# Patient Record
Sex: MALE | Race: NATIVE HAWAIIAN OR OTHER PACIFIC ISLANDER | HISPANIC OR LATINO | URBAN - METROPOLITAN AREA
[De-identification: names, ages, dates, MRNs, and addresses within clinical notes are randomized per-mention and may not be internally consistent; named-entity substitution may affect disease eponyms.]

---

## 2024-11-15 ENCOUNTER — EMERGENCY (EMERGENCY)
Facility: HOSPITAL | Age: 24
LOS: 1 days | Discharge: HOME | End: 2024-11-15
Attending: EMERGENCY MEDICINE
Payer: MEDICAID

## 2024-11-15 VITALS — WEIGHT: 130.07 LBS | HEIGHT: 62.99 IN

## 2024-11-15 VITALS
RESPIRATION RATE: 16 BRPM | OXYGEN SATURATION: 98 % | SYSTOLIC BLOOD PRESSURE: 125 MMHG | HEART RATE: 75 BPM | DIASTOLIC BLOOD PRESSURE: 78 MMHG | TEMPERATURE: 98 F

## 2024-11-15 PROCEDURE — 90471 IMMUNIZATION ADMIN: CPT

## 2024-11-15 PROCEDURE — 99283 EMERGENCY DEPT VISIT LOW MDM: CPT | Mod: 25

## 2024-11-15 PROCEDURE — 73564 X-RAY EXAM KNEE 4 OR MORE: CPT

## 2024-11-15 PROCEDURE — 90715 TDAP VACCINE 7 YRS/> IM: CPT

## 2024-11-15 PROCEDURE — 73564 X-RAY EXAM KNEE 4 OR MORE: CPT | Mod: 26,RT

## 2024-11-15 PROCEDURE — 99284 EMERGENCY DEPT VISIT MOD MDM: CPT

## 2024-11-15 RX ORDER — IBUPROFEN 200 MG
600 TABLET ORAL ONCE
Refills: 0 | Status: COMPLETED | OUTPATIENT
Start: 2024-11-15 | End: 2024-11-15

## 2024-11-15 RX ORDER — CLOSTRIDIUM TETANI TOXOID ANTIGEN (FORMALDEHYDE INACTIVATED), CORYNEBACTERIUM DIPHTHERIAE TOXOID ANTIGEN (FORMALDEHYDE INACTIVATED), BORDETELLA PERTUSSIS TOXOID ANTIGEN (GLUTARALDEHYDE INACTIVATED), BORDETELLA PERTUSSIS FILAMENTOUS HEMAGGLUTININ ANTIGEN (FORMALDEHYDE INACTIVATED), BORDETELLA PERTUSSIS PERTACTIN ANTIGEN, AND BORDETELLA PERTUSSIS FIMBRIAE 2/3 ANTIGEN 5; 2; 2.5; 5; 3; 5 [LF]/.5ML; [LF]/.5ML; UG/.5ML; UG/.5ML; UG/.5ML; UG/.5ML
0.5 INJECTION, SUSPENSION INTRAMUSCULAR ONCE
Refills: 0 | Status: COMPLETED | OUTPATIENT
Start: 2024-11-15 | End: 2024-11-15

## 2024-11-15 RX ORDER — LIDOCAINE HYDROCHLORIDE 40 MG/ML
1 SOLUTION TOPICAL ONCE
Refills: 0 | Status: COMPLETED | OUTPATIENT
Start: 2024-11-15 | End: 2024-11-15

## 2024-11-15 RX ORDER — ACETAMINOPHEN 500 MG
975 TABLET ORAL ONCE
Refills: 0 | Status: COMPLETED | OUTPATIENT
Start: 2024-11-15 | End: 2024-11-15

## 2024-11-15 RX ADMIN — Medication 600 MILLIGRAM(S): at 19:47

## 2024-11-15 RX ADMIN — LIDOCAINE HYDROCHLORIDE 1 PATCH: 40 SOLUTION TOPICAL at 19:48

## 2024-11-15 RX ADMIN — Medication 975 MILLIGRAM(S): at 19:48

## 2024-11-15 RX ADMIN — CLOSTRIDIUM TETANI TOXOID ANTIGEN (FORMALDEHYDE INACTIVATED), CORYNEBACTERIUM DIPHTHERIAE TOXOID ANTIGEN (FORMALDEHYDE INACTIVATED), BORDETELLA PERTUSSIS TOXOID ANTIGEN (GLUTARALDEHYDE INACTIVATED), BORDETELLA PERTUSSIS FILAMENTOUS HEMAGGLUTININ ANTIGEN (FORMALDEHYDE INACTIVATED), BORDETELLA PERTUSSIS PERTACTIN ANTIGEN, AND BORDETELLA PERTUSSIS FIMBRIAE 2/3 ANTIGEN 0.5 MILLILITER(S): 5; 2; 2.5; 5; 3; 5 INJECTION, SUSPENSION INTRAMUSCULAR at 21:57

## 2024-11-15 NOTE — ED ADULT NURSE NOTE - OBJECTIVE STATEMENT
23y/o male presents to ER from home with c/o right knee pain. 20 days ago pt had a work injury where he got a cut above the right knee. Pt noted ecchymosis to right knee about 3 days ago. Knee is now tender to touch. Endorses night sweats. Denies SOB, CP, numbness. A&Ox3, Pt able to ambulate. Safety maintained bed in lowest position and locked.

## 2024-11-15 NOTE — ED ADULT TRIAGE NOTE - CHIEF COMPLAINT QUOTE
Right knee pain  s/p  laceration 3 weeks  ago. Patient  did use  Neosporin but  pain persists at the  site.

## 2024-11-15 NOTE — ED PROVIDER NOTE - OBJECTIVE STATEMENT
24 year old man no PMH presenting with knee pain after a laceration 20 days ago. He was at work and got cut on the right superior knee with a knife, but his boss didn't let him get treatment. He only put gauze on it, and the wound healed well over the past 20 days. A few days ago he began having brusining and pain of the inferior medial knee, worsening pain that comes and goes, pain with bending the knee, and also reporting getting feveres at night, but hasn't taken his temperature. He denies any chills, rashes, swelling, bleeding, pus, chest pain, SOB, abd pain, N/V/D/C, dysuria.     YONAS [id number]

## 2024-11-15 NOTE — CHART NOTE - NSCHARTNOTEFT_GEN_A_CORE
ED SW-    LMSW consulted by ED MD for patient in ED requesting to speak with SW. Per chart, patient is a 25 y/o M presenting with knee laceration that occurred 20 days ago at work and now experiencing worsening pain. Per chart, patient stated his boss would not let him receive treatment. Per ED MD, patient medically cleared for d/c.    LMSW met with patient at bedside to introduce self and role. [employer]  Carrie #[id number] and Shay #[id number] utilized for Citizen of [place] translation. Patient appeared a&ox4 and verbalized understanding. Patient confirmed demographics on chart. Patient expressed he is undocumented without insurance. LMSW educated patient to emergency medicaid insurance and advised patient to follow up with [facility] Financial Counseling Department (number provided to patient) to determine eligibility and application process. Patient verbalized understanding and appreciative.     Patient stated he hurt his knee at work 20 days ago, and his boss refused him to receive treatment. Patient inquired if he can obtain documentation of ED visit to provide to boss. LMSW informed patient that his discharge paperwork will provide information about ED visit to show his boss. Patient also expressed he is unable to obtain contact with his boss and inquired what his next steps should be. LMSW provided patient with Legal Services resource and advised patient to contact to determine next steps with potential legal action. Patient verbalized understanding and appreciative. LMSW inquired about patient's transportation home. Patient confirmed he does not need transportation home and denied further questions/concerns. ED MD made aware of above. No further SW needs. SW to remain available.

## 2024-11-15 NOTE — ED PROVIDER NOTE - PHYSICAL EXAMINATION
Physical Exam:  Gen: no acute distress, AOx3, nontoxic appearing, able to ambulate without assistance  Head: NCAT  HEENT: EOMI, PEERLA, normal conjunctiva, tongue midline, oral mucosa moist  Lung: CTAB, no respiratory distress, no wheezes/rhonchi/rales B/L, speaking in full sentences  CV: RRR, no murmurs, rubs or gallops  Abd: soft, NT, ND, no guarding, no rigidity, no rebound tenderness, no CVA tenderness  MSK: no visible deformities, ROM normal in UE/LE, no neck / back pain, calf tenderness, R knee with well healing laceration, brusining on medial knee with tenderness, can range to 90 degrees and full extension, no effusion.   Neuro: No focal sensory or motor deficits  Skin: Warm, well perfused, no rash, no leg swelling

## 2024-11-15 NOTE — ED PROVIDER NOTE - NSFOLLOWUPCLINICS_GEN_ALL_ED_FT
[facility] Orthopedic Surgery  Orthopedic Surgery  [address], [address]rd & 4th [place] [address]  Phone: [phone]  Fax:   Follow Up Time: 1-3 Days

## 2024-11-15 NOTE — ED PROVIDER NOTE - PROGRESS NOTE DETAILS
Juan Diego ALTMAN PGY1: Patient feeling well, ambulatory, was seen by social work. Discussed follow up, medications, return precautions. Juan Diego ALTMAN PGY1: Patient feeling well, ambulatory, was seen by social work. Discussed follow up, medications, return precautions, reuslts of imaging.

## 2024-11-15 NOTE — ED ADULT TRIAGE NOTE - LANGUAGE ASSISTANCE NEEDED
Addended by: EDILSON LANDIS on: 9/27/2023 10:57 AM     Modules accepted: Orders    
Yes-Patient/Caregiver accepts free interpretation services...

## 2024-11-15 NOTE — ED ADULT TRIAGE NOTE - PAIN: PRESENCE, MLM
No behaviors noted on night shift and no PRN's were given. No signs or symptoms of discomfort or distress noted. Pt is lying in bed with eye's closed. Will continue fifteen minute rounds. complains of pain/discomfort

## 2024-11-15 NOTE — ED PROVIDER NOTE - CLINICAL SUMMARY MEDICAL DECISION MAKING FREE TEXT BOX
24 year old man no PMH presenting with knee pain after a laceration 20 days ago, ambulatory but with pain and tenderness over medial knee concerning for fracture, less likely cellulitis vs septic arthritis given full range of motion and lack of infectious signs. Xray and reeval.

## 2024-11-15 NOTE — ED ADULT NURSE NOTE - CCCP TRG CHIEF CMPLNT
Per monthly Recall Report,  Letter was mailed to patient to inform them that they are due for an appointment with Dr Ayoub.    Reason for visit: 1 year fu  Date last seen: 2/13/19       Warm knee pain/injury

## 2024-11-15 NOTE — ED PROVIDER NOTE - NSFOLLOWUPINSTRUCTIONS_ED_ALL_ED_FT
Le atendieron en urgencias por dolor de rodilla. Jud radiografías no mostraron ningún hueso roto y podía caminar sobre jud rodillas. Para el dolor, puede jose enrique 1000 mg de tylenol cada 6 horas, no más de 4000 mg en un período de 24 horas y ibuprofeno 600 mg cada 6 horas según sea necesario para el dolor. Es muy importante realizar un seguimiento con un médico ortopédico o un médico deportivo.     Regrese al Departamento de Emergencia si experimenta dolor intenso que no se controla con medicamentos, incapacidad para caminar, enrojecimiento e hinchazón de la rodilla, pus saliendo de la herida en la rodilla.    You were seen in the Emergency Department for knee pain. Your xrays did not show any broken bones, and you were able to walk on your knee. For pain you can take 1000mg of tylenol every 6 hours, no more than 4000mg in one 24 period, and ibuprofen 600mg every 6 hours as needed for pain. It is very important to follow up with an orthopedic doctor or a sports medicine doctor.     Return to the Emergency Department if you experience severe pain not controlled with medications, inability to walk, redness and swelling of the knee, pus coming from the wound on the knee. Le atendieron en urgencias por dolor de rodilla. Jud radiografías no mostraron ningún hueso roto y podía caminar sobre jud rodillas. Para el dolor, puede jose enrique 1000 mg de tylenol cada 6 horas, no más de 4000 mg en un período de 24 horas y ibuprofeno 600 mg cada 6 horas según sea necesario para el dolor. Es muy importante realizar un seguimiento con un médico ortopédico o un médico deportivo.     Regrese al Departamento de Emergencia si experimenta dolor intenso que no se controla con medicamentos, incapacidad para caminar, enrojecimiento e hinchazón de la rodilla, pus saliendo de la herida en la rodilla.    You were seen in the Emergency Department for knee pain. Your xrays did not show any broken bones, and you were able to walk on your knee. For pain you can take 1000mg of tylenol every 6 hours, no more than 4000mg in one 24 period, and ibuprofen 600mg every 6 hours as needed for pain. It is very important to follow up with an orthopedic doctor or a sports medicine doctor.     Return to the Emergency Department if you experience severe pain not controlled with medications, inability to walk, redness and swelling of the knee, pus coming from the wound on the knee, swelling, severe pain, redness, or bleeding at the injection site of your TDAP booster, fever, wound breaks open, bad smell coming from wound or dressing, severe pain, increased redness, swelling, or pain at the site of your wound, fluid, blood, or pus coming from your wound, rash, red streak going away from wound, you cannot properly move area of wound, if extremity with wound is pale or blue or any other concerns.

## 2024-11-15 NOTE — ED ADULT NURSE REASSESSMENT NOTE - NS ED NURSE REASSESS COMMENT FT1
Received patient from MICHAEL George, patient at baseline mental status, able to make needs known, patient agreeable to plan of care, pending radiology, comfort and safety provided.

## 2024-11-15 NOTE — ED PROVIDER NOTE - ATTENDING CONTRIBUTION TO CARE
Attending MD Stone: I personally have seen and examined this patient.  Resident note reviewed and agree on plan of care and except where noted.  See below for details.     Seen in [facility]L    24M with no reported contributory PMH/PSH/Meds, no known drug allergies presents to the ED with R knee pain.  Reports at end of October he was cutting carpet and stabbed himself with the tool with which he was cutting.  Reports he did not have treatment at that time.  Reports has been covering it with gauze and reports wound closed.  Reports a few days ago noted bruising appeared distal to the wound site.  Reports has been having pain with bending knee.  Reports has felt warm occasionally but denies howard fever and denies taking temp.  Denies chills, sweats, rash.  Denies purulence from wound, bleeding from wound.  Denies chest pain, shortness of breath, abdominal pain, nausea, vomiting, diarrhea, urinary complaints, URI symptoms. Unknown last TDAP.    Exam:   General: NAD  HENT: head NCAT, airway patent   Chest: symmetric chest rise, no increased work of breathing  MSK: ranging neck freely, R knee with healing 1.5cm wound at supramedial aspect of knee, no surrounding erythema, no overlying warmth, no fluctuance, +ecchymosis distal to wound at distal medial knee, able to flex at least to 90 and extend unassisted, no gross effusion, +2 DPs, no calf tenderness, swelling, erythema or warmth, +mild tenderness to periwound site  Neuro: moving all extremities spontaneously, sensory grossly intact, no gross neuro deficits  Psych: normal mood and affect     A/P: 24M with R knee ecchymosis and minimal elisa-injury tenderness, will obtain XR to eval for retained foreign body, or chip fracture or air, will update TDAP and will give analgesia

## 2024-11-15 NOTE — ED PROVIDER NOTE - PATIENT PORTAL LINK FT
You can access the FollowMyHealth Patient Portal offered by [facility] by registering at the following website: [email or web address]. By joining [employer]’s FollowMyHealth portal, you will also be able to view your health information using other applications (apps) compatible with our system.

## 2025-05-10 ENCOUNTER — EMERGENCY (EMERGENCY)
Facility: HOSPITAL | Age: 25
LOS: 1 days | End: 2025-05-10
Attending: EMERGENCY MEDICINE
Payer: MEDICAID

## 2025-05-10 VITALS
SYSTOLIC BLOOD PRESSURE: 118 MMHG | HEIGHT: 62.6 IN | RESPIRATION RATE: 20 BRPM | TEMPERATURE: 98 F | WEIGHT: 130.07 LBS | DIASTOLIC BLOOD PRESSURE: 73 MMHG | OXYGEN SATURATION: 98 % | HEART RATE: 107 BPM

## 2025-05-10 PROBLEM — Z78.9 OTHER SPECIFIED HEALTH STATUS: Chronic | Status: ACTIVE | Noted: 2024-11-15

## 2025-05-10 LAB
ALBUMIN SERPL ELPH-MCNC: 4.6 G/DL — SIGNIFICANT CHANGE UP (ref 3.3–5)
ALP SERPL-CCNC: 75 U/L — SIGNIFICANT CHANGE UP (ref 40–120)
ALT FLD-CCNC: 31 U/L — SIGNIFICANT CHANGE UP (ref 10–45)
ANION GAP SERPL CALC-SCNC: 12 MMOL/L — SIGNIFICANT CHANGE UP (ref 5–17)
APPEARANCE UR: CLEAR — SIGNIFICANT CHANGE UP
AST SERPL-CCNC: 27 U/L — SIGNIFICANT CHANGE UP (ref 10–40)
BASOPHILS # BLD AUTO: 0.05 K/UL — SIGNIFICANT CHANGE UP (ref 0–0.2)
BASOPHILS NFR BLD AUTO: 0.6 % — SIGNIFICANT CHANGE UP (ref 0–2)
BILIRUB SERPL-MCNC: 0.6 MG/DL — SIGNIFICANT CHANGE UP (ref 0.2–1.2)
BILIRUB UR-MCNC: NEGATIVE — SIGNIFICANT CHANGE UP
BUN SERPL-MCNC: 8 MG/DL — SIGNIFICANT CHANGE UP (ref 7–23)
CALCIUM SERPL-MCNC: 9.6 MG/DL — SIGNIFICANT CHANGE UP (ref 8.4–10.5)
CHLORIDE SERPL-SCNC: 100 MMOL/L — SIGNIFICANT CHANGE UP (ref 96–108)
CO2 SERPL-SCNC: 25 MMOL/L — SIGNIFICANT CHANGE UP (ref 22–31)
COLOR SPEC: YELLOW — SIGNIFICANT CHANGE UP
CREAT SERPL-MCNC: 0.78 MG/DL — SIGNIFICANT CHANGE UP (ref 0.5–1.3)
DIFF PNL FLD: NEGATIVE — SIGNIFICANT CHANGE UP
EGFR: 125 ML/MIN/1.73M2 — SIGNIFICANT CHANGE UP
EGFR: 125 ML/MIN/1.73M2 — SIGNIFICANT CHANGE UP
EOSINOPHIL # BLD AUTO: 0.23 K/UL — SIGNIFICANT CHANGE UP (ref 0–0.5)
EOSINOPHIL NFR BLD AUTO: 2.9 % — SIGNIFICANT CHANGE UP (ref 0–6)
GLUCOSE SERPL-MCNC: 101 MG/DL — HIGH (ref 70–99)
GLUCOSE UR QL: NEGATIVE MG/DL — SIGNIFICANT CHANGE UP
HCT VFR BLD CALC: 47.4 % — SIGNIFICANT CHANGE UP (ref 39–50)
HGB BLD-MCNC: 15.9 G/DL — SIGNIFICANT CHANGE UP (ref 13–17)
IMM GRANULOCYTES NFR BLD AUTO: 0.4 % — SIGNIFICANT CHANGE UP (ref 0–0.9)
KETONES UR-MCNC: 40 MG/DL
LACTATE BLDV-MCNC: 1 MMOL/L — SIGNIFICANT CHANGE UP (ref 0.5–2)
LEUKOCYTE ESTERASE UR-ACNC: NEGATIVE — SIGNIFICANT CHANGE UP
LIDOCAIN IGE QN: 41 U/L — SIGNIFICANT CHANGE UP (ref 7–60)
LYMPHOCYTES # BLD AUTO: 1.34 K/UL — SIGNIFICANT CHANGE UP (ref 1–3.3)
LYMPHOCYTES # BLD AUTO: 16.6 % — SIGNIFICANT CHANGE UP (ref 13–44)
MCHC RBC-ENTMCNC: 29.2 PG — SIGNIFICANT CHANGE UP (ref 27–34)
MCHC RBC-ENTMCNC: 33.5 G/DL — SIGNIFICANT CHANGE UP (ref 32–36)
MCV RBC AUTO: 87.1 FL — SIGNIFICANT CHANGE UP (ref 80–100)
MONOCYTES # BLD AUTO: 1.13 K/UL — HIGH (ref 0–0.9)
MONOCYTES NFR BLD AUTO: 14 % — SIGNIFICANT CHANGE UP (ref 2–14)
NEUTROPHILS # BLD AUTO: 5.27 K/UL — SIGNIFICANT CHANGE UP (ref 1.8–7.4)
NEUTROPHILS NFR BLD AUTO: 65.5 % — SIGNIFICANT CHANGE UP (ref 43–77)
NITRITE UR-MCNC: NEGATIVE — SIGNIFICANT CHANGE UP
NRBC BLD AUTO-RTO: 0 /100 WBCS — SIGNIFICANT CHANGE UP (ref 0–0)
PH UR: 6 — SIGNIFICANT CHANGE UP (ref 5–8)
PLATELET # BLD AUTO: 239 K/UL — SIGNIFICANT CHANGE UP (ref 150–400)
POTASSIUM SERPL-MCNC: 4.4 MMOL/L — SIGNIFICANT CHANGE UP (ref 3.5–5.3)
POTASSIUM SERPL-SCNC: 4.4 MMOL/L — SIGNIFICANT CHANGE UP (ref 3.5–5.3)
PROT SERPL-MCNC: 7.9 G/DL — SIGNIFICANT CHANGE UP (ref 6–8.3)
PROT UR-MCNC: SIGNIFICANT CHANGE UP MG/DL
RBC # BLD: 5.44 M/UL — SIGNIFICANT CHANGE UP (ref 4.2–5.8)
RBC # FLD: 12.5 % — SIGNIFICANT CHANGE UP (ref 10.3–14.5)
SODIUM SERPL-SCNC: 137 MMOL/L — SIGNIFICANT CHANGE UP (ref 135–145)
SP GR SPEC: >=1.099 (ref 1–1.03)
UROBILINOGEN FLD QL: 1 MG/DL — SIGNIFICANT CHANGE UP (ref 0.2–1)
WBC # BLD: 8.05 K/UL — SIGNIFICANT CHANGE UP (ref 3.8–10.5)
WBC # FLD AUTO: 8.05 K/UL — SIGNIFICANT CHANGE UP (ref 3.8–10.5)

## 2025-05-10 PROCEDURE — 74177 CT ABD & PELVIS W/CONTRAST: CPT | Mod: 26

## 2025-05-10 PROCEDURE — 99285 EMERGENCY DEPT VISIT HI MDM: CPT

## 2025-05-10 PROCEDURE — 71275 CT ANGIOGRAPHY CHEST: CPT | Mod: 26

## 2025-05-10 PROCEDURE — 71045 X-RAY EXAM CHEST 1 VIEW: CPT | Mod: 26

## 2025-05-10 RX ORDER — AZITHROMYCIN 250 MG
500 CAPSULE ORAL ONCE
Refills: 0 | Status: COMPLETED | OUTPATIENT
Start: 2025-05-10 | End: 2025-05-10

## 2025-05-10 RX ORDER — AZITHROMYCIN 250 MG
1 CAPSULE ORAL
Qty: 3 | Refills: 0
Start: 2025-05-10 | End: 2025-05-12

## 2025-05-10 RX ORDER — CEFTRIAXONE 500 MG/1
1000 INJECTION, POWDER, FOR SOLUTION INTRAMUSCULAR; INTRAVENOUS ONCE
Refills: 0 | Status: COMPLETED | OUTPATIENT
Start: 2025-05-10 | End: 2025-05-10

## 2025-05-10 RX ORDER — KETOROLAC TROMETHAMINE 30 MG/ML
30 INJECTION, SOLUTION INTRAMUSCULAR; INTRAVENOUS ONCE
Refills: 0 | Status: DISCONTINUED | OUTPATIENT
Start: 2025-05-10 | End: 2025-05-10

## 2025-05-10 RX ORDER — AMOXICILLIN AND CLAVULANATE POTASSIUM 500; 125 MG/1; MG/1
1 TABLET, FILM COATED ORAL
Qty: 10 | Refills: 0
Start: 2025-05-10 | End: 2025-05-14

## 2025-05-10 RX ADMIN — Medication 20 MILLIGRAM(S): at 16:14

## 2025-05-10 RX ADMIN — Medication 1000 MILLILITER(S): at 16:14

## 2025-05-10 RX ADMIN — KETOROLAC TROMETHAMINE 30 MILLIGRAM(S): 30 INJECTION, SOLUTION INTRAMUSCULAR; INTRAVENOUS at 16:14

## 2025-05-10 RX ADMIN — CEFTRIAXONE 100 MILLIGRAM(S): 500 INJECTION, POWDER, FOR SOLUTION INTRAMUSCULAR; INTRAVENOUS at 22:01

## 2025-05-10 RX ADMIN — Medication 250 MILLIGRAM(S): at 22:25

## 2025-05-10 NOTE — ED PROVIDER NOTE - OBJECTIVE STATEMENT
25-year-old male no significant past medical history came into the emergency room complaining of left lower quadrant pain and back pain for the last 2 weeks, no fever no chills denies urinary frequency urgency, states he is not sexually active denies any penis discharge or testicular pain, denies nausea vomiting diarrhea

## 2025-05-10 NOTE — ED ADULT NURSE NOTE - OBJECTIVE STATEMENT
25YM no significant PMHX presents to the ED c/o LLQ radiating to back for 2 weeks. 25YM no significant PMHX presents to the ED c/o LLQ radiating to back for 2 weeks. upon assessment, pt is A&OX4 oriented to self, place, time, situation. satting 98% on rm air. Afebrile orally. respirations even and unlabored. abdomen soft, nondistended. skin intact. MD Greer at bedside to assess. 20G IV inserted Right AC. Patient undressed and placed into gown, call bell in hand and side rails up for safety. warm blanket provided, vital signs stable, pt in no acute distress. updated on plan of care. comfort and safety maintained

## 2025-05-10 NOTE — ED PROVIDER NOTE - PATIENT PORTAL LINK FT
You can access the FollowMyHealth Patient Portal offered by [facility] by registering at the following website: [email or web address]. By joining [employer]’s FollowMyHealth portal, you will also be able to view your health information using other applications (apps) compatible with our system.

## 2025-05-10 NOTE — ED PROVIDER NOTE - CONSTITUTIONAL, MLM
She can skip the oral contrast, order updated, please notify pt   Well appearing, awake, alert, oriented to person, place, time/situation and in no apparent distress. normal...

## 2025-05-10 NOTE — ED PROVIDER NOTE - GASTROINTESTINAL, MLM
Abdomen soft  mild left lower quadrant tenderness, testicles are normal no tenderness, penis is normal no lesions no discharge, no CVA tenderness

## 2025-05-10 NOTE — ED PROVIDER NOTE - PROGRESS NOTE DETAILS
Kely Gotti DO (PGY-1): CT with L lung base consolidation and adjacent pathcy opacities, will obtain CTA as concern for pulmonary infarct   prostate hyperemic could represent prostatitis

## 2025-05-10 NOTE — ED PROVIDER NOTE - CLINICAL SUMMARY MEDICAL DECISION MAKING FREE TEXT BOX
25-year-old male , presented with 2 weeks history of left lower quadrant pain goes to his back, no nausea no vomiting no fever no chills no urinary frequency or urgency, has normal bowel movements  physical exam is unremarkable besides mild tenderness in the left lower quadrant,  exam is normal    will obtain blood work UA IV hydration Toradol and reassess ZR

## 2025-05-10 NOTE — ED PROVIDER NOTE - NSFOLLOWUPINSTRUCTIONS_ED_ALL_ED_FT
1. You presented to the emergency department for abdominal pain. You had pneumonia and a slightly inflamed prostate, but no true abdominal organ dysfunction we could identify by blood work or imaging. We are sending you home with antibiotics to treat the pneumonia. Please finish the entire course.     2. Your evaluation in the emergency department included a physician evaluation. Your work-up did not reveal any findings indicating the need for admission to the hospital or any emergent interventions at this time.     3. It is recommended that you follow-up with your primary care provider in the next week.    4. Please continue taking your regular medications as prescribed. For pain you may take 400-600 mg IBUPROFEN or 500-1000mg ACETAMINOPHEN every 6-8 hours - as needed.  This is an over-the-counter medication - please read the instructions for use and warnings on the label. If you have any questions regarding its use, you may refer them to your local pharmacist.    5. PLEASE RETURN TO THE EMERGENCY DEPARTMENT IMMEDIATELY IF you develop any fevers not responding to over the counter medications, uncontrollable nausea and vomiting, an inability to tolerate eating and drinking, difficulty breathing, chest pain, a severe increase in your symptoms or pain, or any other new symptoms that concern you.

## 2025-05-11 VITALS
SYSTOLIC BLOOD PRESSURE: 138 MMHG | RESPIRATION RATE: 18 BRPM | DIASTOLIC BLOOD PRESSURE: 87 MMHG | HEART RATE: 88 BPM | OXYGEN SATURATION: 99 % | TEMPERATURE: 99 F

## 2025-05-11 LAB — HIV 1 & 2 AB SERPL IA.RAPID: SIGNIFICANT CHANGE UP

## 2025-05-11 PROCEDURE — 87040 BLOOD CULTURE FOR BACTERIA: CPT

## 2025-05-11 PROCEDURE — 83605 ASSAY OF LACTIC ACID: CPT

## 2025-05-11 PROCEDURE — 96375 TX/PRO/DX INJ NEW DRUG ADDON: CPT | Mod: XU

## 2025-05-11 PROCEDURE — 86703 HIV-1/HIV-2 1 RESULT ANTBDY: CPT

## 2025-05-11 PROCEDURE — 74177 CT ABD & PELVIS W/CONTRAST: CPT

## 2025-05-11 PROCEDURE — 71275 CT ANGIOGRAPHY CHEST: CPT

## 2025-05-11 PROCEDURE — 99284 EMERGENCY DEPT VISIT MOD MDM: CPT | Mod: 25

## 2025-05-11 PROCEDURE — 83690 ASSAY OF LIPASE: CPT

## 2025-05-11 PROCEDURE — 85025 COMPLETE CBC W/AUTO DIFF WBC: CPT

## 2025-05-11 PROCEDURE — 96374 THER/PROPH/DIAG INJ IV PUSH: CPT | Mod: XU

## 2025-05-11 PROCEDURE — 80053 COMPREHEN METABOLIC PANEL: CPT

## 2025-05-11 PROCEDURE — 86780 TREPONEMA PALLIDUM: CPT

## 2025-05-11 PROCEDURE — 87591 N.GONORRHOEAE DNA AMP PROB: CPT

## 2025-05-11 PROCEDURE — 87491 CHLMYD TRACH DNA AMP PROBE: CPT

## 2025-05-11 PROCEDURE — 71045 X-RAY EXAM CHEST 1 VIEW: CPT

## 2025-05-11 PROCEDURE — 81003 URINALYSIS AUTO W/O SCOPE: CPT

## 2025-05-11 NOTE — ED POST DISCHARGE NOTE - RESULT SUMMARY
incidental findings on CT a/p : hyperemic prostate (with neg UA), LLL opacity pna vs infarct. pt received f/u CTA of the chest in the ED showing PNA. pt treated and discharged home, d/c paperwork discussed inflamed prostate and has pcp f/u. -JANINE TrammellC

## 2025-05-12 LAB
C TRACH RRNA SPEC QL NAA+PROBE: SIGNIFICANT CHANGE UP
N GONORRHOEA RRNA SPEC QL NAA+PROBE: SIGNIFICANT CHANGE UP
SPECIMEN SOURCE: SIGNIFICANT CHANGE UP

## 2025-05-15 LAB — T PALLIDUM AB TITR SER: NEGATIVE — SIGNIFICANT CHANGE UP

## 2025-05-16 LAB
CULTURE RESULTS: SIGNIFICANT CHANGE UP
CULTURE RESULTS: SIGNIFICANT CHANGE UP
SPECIMEN SOURCE: SIGNIFICANT CHANGE UP
SPECIMEN SOURCE: SIGNIFICANT CHANGE UP